# Patient Record
Sex: MALE | Race: WHITE | NOT HISPANIC OR LATINO | Employment: STUDENT | URBAN - METROPOLITAN AREA
[De-identification: names, ages, dates, MRNs, and addresses within clinical notes are randomized per-mention and may not be internally consistent; named-entity substitution may affect disease eponyms.]

---

## 2023-10-12 ENCOUNTER — TELEPHONE (OUTPATIENT)
Dept: PEDIATRIC ENDOCRINOLOGY | Facility: HOSPITAL | Age: 23
End: 2023-10-12
Payer: COMMERCIAL

## 2023-10-12 NOTE — TELEPHONE ENCOUNTER
Patient called office requesting an increase of supplies to 4 boxes from DME [employer]lier [employer].    This RN contacted [facility], see the following email response:    Good morning,  I wanted to update you on this patient Noe Rowe 2000. The patient has a current order with pump supplies with a quantity of  4 boxes for  [id number] and a updated DWO has already been faxed to the HCP.    Have a great day! ??

## 2023-10-22 PROBLEM — E10.9 TYPE 1 DIABETES MELLITUS WITH HEMOGLOBIN A1C GOAL OF LESS THAN 7.0% (MULTI): Status: ACTIVE | Noted: 2023-10-22

## 2023-10-22 PROBLEM — S60.211A CONTUSION OF RIGHT WRIST: Status: ACTIVE | Noted: 2023-10-22

## 2023-10-22 PROBLEM — E10.649: Status: ACTIVE | Noted: 2023-10-22

## 2023-10-22 PROBLEM — R11.0 NAUSEA IN ADULT: Status: ACTIVE | Noted: 2023-10-22

## 2023-10-22 PROBLEM — S93.409A ANKLE SPRAIN: Status: ACTIVE | Noted: 2023-10-22

## 2023-10-22 PROBLEM — K11.7 INCREASED SALIVATION: Status: ACTIVE | Noted: 2023-10-22

## 2023-10-22 PROBLEM — L70.0 ACNE VULGARIS: Status: ACTIVE | Noted: 2020-01-22

## 2023-10-22 PROBLEM — S99.919A ANKLE INJURY: Status: ACTIVE | Noted: 2023-10-22

## 2023-10-22 RX ORDER — GLUCAGON 3 MG/1
POWDER NASAL
COMMUNITY
Start: 2019-11-07

## 2023-10-22 RX ORDER — INSULIN LISPRO 100 [IU]/ML
120 INJECTION, SOLUTION INTRAVENOUS; SUBCUTANEOUS DAILY
COMMUNITY
End: 2024-01-12 | Stop reason: SDUPTHER

## 2023-10-22 RX ORDER — TRETINOIN 0.25 MG/G
CREAM TOPICAL
COMMUNITY
Start: 2019-10-30 | End: 2024-06-06 | Stop reason: WASHOUT

## 2023-10-22 RX ORDER — INSULIN GLARGINE 100 [IU]/ML
41 INJECTION, SOLUTION SUBCUTANEOUS DAILY
COMMUNITY
Start: 2020-01-06

## 2023-10-22 RX ORDER — ONDANSETRON 4 MG/1
TABLET, ORALLY DISINTEGRATING ORAL
COMMUNITY
Start: 2023-02-25 | End: 2024-06-06 | Stop reason: WASHOUT

## 2023-10-22 RX ORDER — MULTIVITAMIN
TABLET ORAL
COMMUNITY

## 2023-10-22 RX ORDER — BLOOD-GLUCOSE TRANSMITTER
EACH MISCELLANEOUS AS NEEDED
COMMUNITY

## 2023-10-22 RX ORDER — GLUCAGON HCL 1 MG
VIAL (EA) INJECTION
COMMUNITY
End: 2024-06-06 | Stop reason: WASHOUT

## 2023-10-24 ENCOUNTER — APPOINTMENT (OUTPATIENT)
Dept: PEDIATRIC ENDOCRINOLOGY | Facility: CLINIC | Age: 23
End: 2023-10-24
Payer: COMMERCIAL

## 2023-11-28 ENCOUNTER — APPOINTMENT (OUTPATIENT)
Dept: PEDIATRIC ENDOCRINOLOGY | Facility: CLINIC | Age: 23
End: 2023-11-28
Payer: COMMERCIAL

## 2024-01-12 DIAGNOSIS — E10.9 TYPE 1 DIABETES MELLITUS WITH HEMOGLOBIN A1C GOAL OF LESS THAN 7.0% (MULTI): ICD-10-CM

## 2024-01-12 RX ORDER — INSULIN LISPRO 100 [IU]/ML
INJECTION, SOLUTION INTRAVENOUS; SUBCUTANEOUS
Qty: 40 ML | Refills: 0 | Status: SHIPPED | OUTPATIENT
Start: 2024-01-12 | End: 2024-03-10 | Stop reason: SDUPTHER

## 2024-01-31 ENCOUNTER — APPOINTMENT (OUTPATIENT)
Dept: ENDOCRINOLOGY | Facility: CLINIC | Age: 24
End: 2024-01-31
Payer: COMMERCIAL

## 2024-02-19 DIAGNOSIS — E10.9 TYPE 1 DIABETES MELLITUS WITH HEMOGLOBIN A1C GOAL OF LESS THAN 7.0% (MULTI): ICD-10-CM

## 2024-03-10 DIAGNOSIS — E10.9 TYPE 1 DIABETES MELLITUS WITH HEMOGLOBIN A1C GOAL OF LESS THAN 7.0% (MULTI): ICD-10-CM

## 2024-03-10 RX ORDER — INSULIN LISPRO 100 [IU]/ML
INJECTION, SOLUTION INTRAVENOUS; SUBCUTANEOUS
Qty: 40 ML | Refills: 3 | Status: SHIPPED | OUTPATIENT
Start: 2024-03-10

## 2024-04-29 ENCOUNTER — TELEPHONE (OUTPATIENT)
Dept: PEDIATRIC ENDOCRINOLOGY | Facility: HOSPITAL | Age: 24
End: 2024-04-29
Payer: COMMERCIAL

## 2024-04-29 NOTE — TELEPHONE ENCOUNTER
Noe faxed his BMV to the endocrine office. Last office visit 1/2023. [facility] in 12/2023 with peds endo and [facility] for adult endo 1/2024. Attempted to call Noe to explain the office could not sign his BMV form since he has not been seen in >1 year and the office does not have an up to date glucose download. Unable to LVM due to a full mailbox

## 2024-05-22 ENCOUNTER — APPOINTMENT (OUTPATIENT)
Dept: ENDOCRINOLOGY | Facility: CLINIC | Age: 24
End: 2024-05-22
Payer: COMMERCIAL

## 2024-06-04 ENCOUNTER — TELEPHONE (OUTPATIENT)
Dept: PEDIATRIC ENDOCRINOLOGY | Facility: HOSPITAL | Age: 24
End: 2024-06-04
Payer: COMMERCIAL

## 2024-06-04 NOTE — TELEPHONE ENCOUNTER
Noe called and LVM requesting a call back to discuss his BMV form.    Called Noe back - he sent his BMV form over in April and he is wondering if it could be signed. Discussed with Noe that his last visit with us was 1/2023 and we would need to see him and download his pump prior to signing his BMV form. Noe stated understanding - forwarded him to the secretaries to schedule an appointment.

## 2024-06-06 ENCOUNTER — OFFICE VISIT (OUTPATIENT)
Dept: PEDIATRIC ENDOCRINOLOGY | Facility: CLINIC | Age: 24
End: 2024-06-06
Payer: COMMERCIAL

## 2024-06-06 VITALS
SYSTOLIC BLOOD PRESSURE: 122 MMHG | RESPIRATION RATE: 20 BRPM | WEIGHT: 212.96 LBS | HEART RATE: 68 BPM | DIASTOLIC BLOOD PRESSURE: 82 MMHG | HEIGHT: 70 IN | BODY MASS INDEX: 30.49 KG/M2

## 2024-06-06 DIAGNOSIS — E10.9 TYPE 1 DIABETES MELLITUS WITH HEMOGLOBIN A1C GOAL OF LESS THAN 7.0% (MULTI): Primary | ICD-10-CM

## 2024-06-06 LAB — POC HEMOGLOBIN A1C: 6.4 % (ref 4.2–6.5)

## 2024-06-06 RX ORDER — BLOOD-GLUCOSE SENSOR
EACH MISCELLANEOUS
COMMUNITY

## 2024-06-06 SDOH — ECONOMIC STABILITY: FOOD INSECURITY: WITHIN THE PAST 12 MONTHS, YOU WORRIED THAT YOUR FOOD WOULD RUN OUT BEFORE YOU GOT MONEY TO BUY MORE.: NEVER TRUE

## 2024-06-06 SDOH — ECONOMIC STABILITY: FOOD INSECURITY: WITHIN THE PAST 12 MONTHS, THE FOOD YOU BOUGHT JUST DIDN'T LAST AND YOU DIDN'T HAVE MONEY TO GET MORE.: NEVER TRUE

## 2024-06-06 ASSESSMENT — PATIENT HEALTH QUESTIONNAIRE - PHQ9
SUM OF ALL RESPONSES TO PHQ9 QUESTIONS 1 & 2: 0
1. LITTLE INTEREST OR PLEASURE IN DOING THINGS: NOT AT ALL
2. FEELING DOWN, DEPRESSED OR HOPELESS: NOT AT ALL

## 2024-06-06 ASSESSMENT — LIFESTYLE VARIABLES
HOW MANY STANDARD DRINKS CONTAINING ALCOHOL DO YOU HAVE ON A TYPICAL DAY: 1 OR 2
AUDIT-C TOTAL SCORE: 1
HOW OFTEN DO YOU HAVE A DRINK CONTAINING ALCOHOL: MONTHLY OR LESS
SKIP TO QUESTIONS 9-10: 1
HOW OFTEN DO YOU HAVE SIX OR MORE DRINKS ON ONE OCCASION: NEVER

## 2024-06-06 NOTE — PATIENT INSTRUCTIONS
It was good to see you today!  Your A1c was 6.4%.    Plan:  Integrate your sensor with your pump and turn Control IQ on.  If there is a lag time, call Tandem customer service to see if they can troubleshoot why there is a lag.  Make sure your date and time are correct in your pump.  We are giving you less for basal rate and less for carb coverage.  Due for eye exam.  Have results faxed to our office.  Due for annual labs.  They do need to be fasting; you can to go any  lab first thing in the morning before you eat.  We are signing your 's form today.  Log into Tandem Portal and request a software update.  Once you have updated your pump, contact [employer] and request to switch your sensors to Dexcom G7.  Follow up in 3 months with Dr. Adriana Alvarado.

## 2024-06-06 NOTE — PROGRESS NOTES
Subjective   Noe Rowe is a 24 y.o. male with type 1 diabetes.   Here by himself.  Last visit was January 2023    HPI  Diagnosed with diabetes 7/14/14 at [facility]  Transferred to [facility] pediatric endocrinology in January 2018.    Other Medical History:   None    Manages diabetes with Tandem T slim x2, not using control IQ    Current Pump Settings   HumaLOG U-100 Insulin 100 unit/mL solution   Last edited by Teressa Quinones RN on 6/6/2024 at 4:06 PM      Duration of insulin action:  2 hours      Basal Rate   Total Basal Dose: 40.8 units/day   Time units/hr   12:00 AM 1.7      Blood Glucose Target   Time mg/dL   12:00  - 120      Sensitivity Factor   Time mg/dL/unit   12:00 AM 25      Carb Ratio   Time g/unit   12:00 AM 6     -TDD: 98.78  -Total daily basal: 39.29  -Basal %: 40  -BG average: 154  -CGM wear time (%): 99.6  -Daily carb average: 268g     Concerns at this visit:   - needs BMV form signed  - going low overnight frequently (lows 10%)  - Not in Control IQ because he states that there was a lag time of ~15 minutes between what the G6 smith read and what his pump Dexcom values read.  However, pump date/time were off (today's date on pump was 10/30/23, off by 1.5 hours), which could have contributed to the lag in Dexcom values.    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  - Site rotation: abdomen, uses Autosoft 90, changes every 2-3 days     Carbohydrate counting:   - Patient states they are good at counting carbs.  - Patient states they are good at adherence to bolusing for  carbs.     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, golfs, plays basketball, treats lows as they come     Education Reviewed: driving with diabetes, Control IQ, Exercise, alcohol and diabetes, Dexcom G7, exercise mode in control IQ, site rotation, urine ketone checking, transition to adult endocrinology     Goals         Turn on Control IQ (pt-stated)       Integrate CGM and pump              Date of Diabetes Diagnosis: 07/14/14  Antibody Status at Diagnosis: BRYANT 65 AB positive (>250) 7/15/14  CGM Type: Dexcom G6  Using AID System: No  Boluses Per Day: 6  Time in range 70-180mg/dL (%): 59  Time low <70mg/dL (%): 10  Hypoglycemia Unawareness : No  ED/Hospitalizations related to Diabetes: No  ED/Hospitalization not related to Diabetes: Yes  ED/Hospitalization (Not Diabetes Related) Date: 02/20/23 (for broken foot)  ED/Hospitalization related to DKA: No  Severe Hypoglycemia (coma, seizure, disorientation, or the need for high dose glucagon) since last visit: No         Review of Systems   Constitutional:  Negative for activity change, appetite change, diaphoresis, fatigue and unexpected weight change.   HENT:  Negative for congestion, sore throat and voice change.    Respiratory:  Negative for cough, shortness of breath and wheezing.    Cardiovascular:  Negative for chest pain and palpitations.   Gastrointestinal:  Negative for abdominal pain, constipation, diarrhea, nausea and vomiting.   Endocrine: Negative for cold intolerance, heat intolerance, polydipsia, polyphagia and polyuria.   Genitourinary:  Negative for enuresis.   Musculoskeletal:  Negative for arthralgias and myalgias.   Skin:  Negative for rash.   Neurological:  Negative for seizures, weakness and headaches.   Psychiatric/Behavioral:  Negative for  "dysphoric mood and sleep disturbance. The patient is not nervous/anxious.    All other systems reviewed and are negative.      Objective   /82 (BP Location: Right arm, Patient Position: Sitting, BP Cuff Size: Large adult)   Pulse 68   Resp 20   Ht 1.774 m (5' 9.84\")   Wt 96.6 kg (212 lb 15.4 oz)   BMI 30.69 kg/m²      Physical Exam  Vitals reviewed. Exam conducted with a chaperone present.   Constitutional:       General: He is not in acute distress.     Appearance: Normal appearance.   HENT:      Head: Normocephalic.      Mouth/Throat:      Mouth: Mucous membranes are moist.   Eyes:      Conjunctiva/sclera: Conjunctivae normal.   Neck:      Comments: Normal thyroid, no nodules  Pulmonary:      Effort: Pulmonary effort is normal.   Skin:     General: Skin is warm.      Comments: No lipoatrophy or lipohypertrophy   Neurological:      General: No focal deficit present.      Mental Status: He is alert and oriented to person, place, and time.   Psychiatric:         Mood and Affect: Mood normal.         Behavior: Behavior normal.          Lab  Lab Results   Component Value Date    HGBA1C 6.4 06/06/2024    HGBA1C 7.9 05/04/2021       Assessment/Plan   Noe Rowe is a 24 y.o. male with type 1 diabetes. HbA1c at target. Good BP.   Would benefit from being on Tandem Control IQ.  Due for eye exam and annual diabetes labs.  Glucose Monitoring: having hypos, will back off on basal and carb coverage.       Insulin Instructions  Pump Settings   HumaLOG U-100 Insulin 100 unit/mL solution   Last edited by Teressa Quinones RN on 6/6/2024 at 5:00 PM      Duration of insulin action:  2 hours      Basal Rate   Total Basal Dose: 36 units/day   Time units/hr   12:00 AM 1.5      Blood Glucose Target   Time mg/dL   12:00  - 120      Sensitivity Factor   Time mg/dL/unit   12:00 AM 25      Carb Ratio   Time g/unit   12:00 AM 7       CGM Interpretation/Plan   14 day CGM download was reviewed in detail as documented above " under GLUCOSE MONITORING and will be attached to chart.  A minimum of 72 hours of glucose data was used to inform the management plan outlined above.    Teressa Quinones RN

## 2024-06-07 ASSESSMENT — ENCOUNTER SYMPTOMS
CONSTIPATION: 0
DIAPHORESIS: 0
ACTIVITY CHANGE: 0
WEAKNESS: 0
ABDOMINAL PAIN: 0
PALPITATIONS: 0
COUGH: 0
SHORTNESS OF BREATH: 0
NAUSEA: 0
UNEXPECTED WEIGHT CHANGE: 0
WHEEZING: 0
HEADACHES: 0
DYSPHORIC MOOD: 0
POLYPHAGIA: 0
SEIZURES: 0
NERVOUS/ANXIOUS: 0
ARTHRALGIAS: 0
VOMITING: 0
FATIGUE: 0
POLYDIPSIA: 0
APPETITE CHANGE: 0
SLEEP DISTURBANCE: 0
DIARRHEA: 0
SORE THROAT: 0
MYALGIAS: 0
VOICE CHANGE: 0

## 2024-07-06 DIAGNOSIS — E10.9 TYPE 1 DIABETES MELLITUS WITH HEMOGLOBIN A1C GOAL OF LESS THAN 7.0% (MULTI): ICD-10-CM

## 2024-07-09 RX ORDER — INSULIN LISPRO 100 [IU]/ML
INJECTION, SOLUTION INTRAVENOUS; SUBCUTANEOUS
Qty: 40 ML | Refills: 11 | Status: SHIPPED | OUTPATIENT
Start: 2024-07-09

## 2024-09-12 ENCOUNTER — APPOINTMENT (OUTPATIENT)
Dept: PEDIATRIC ENDOCRINOLOGY | Facility: CLINIC | Age: 24
End: 2024-09-12
Payer: COMMERCIAL

## 2024-09-12 ENCOUNTER — LAB (OUTPATIENT)
Dept: LAB | Facility: LAB | Age: 24
End: 2024-09-12
Payer: COMMERCIAL

## 2024-09-12 VITALS
WEIGHT: 209.8 LBS | RESPIRATION RATE: 18 BRPM | HEART RATE: 76 BPM | BODY MASS INDEX: 30.03 KG/M2 | DIASTOLIC BLOOD PRESSURE: 81 MMHG | SYSTOLIC BLOOD PRESSURE: 127 MMHG | HEIGHT: 70 IN

## 2024-09-12 DIAGNOSIS — E10.65 TYPE 1 DIABETES MELLITUS WITH HYPERGLYCEMIA (MULTI): ICD-10-CM

## 2024-09-12 DIAGNOSIS — E10.9 TYPE 1 DIABETES MELLITUS WITH HEMOGLOBIN A1C GOAL OF LESS THAN 7.0% (MULTI): ICD-10-CM

## 2024-09-12 DIAGNOSIS — E10.649 HYPOGLYCEMIA DUE TO TYPE 1 DIABETES MELLITUS (MULTI): ICD-10-CM

## 2024-09-12 DIAGNOSIS — Z96.41 INSULIN PUMP IN PLACE: ICD-10-CM

## 2024-09-12 DIAGNOSIS — E10.65 TYPE 1 DIABETES MELLITUS WITH HYPERGLYCEMIA (MULTI): Primary | ICD-10-CM

## 2024-09-12 PROBLEM — S99.919A ANKLE INJURY: Status: RESOLVED | Noted: 2023-10-22 | Resolved: 2024-09-12

## 2024-09-12 PROBLEM — R11.0 NAUSEA IN ADULT: Status: RESOLVED | Noted: 2023-10-22 | Resolved: 2024-09-12

## 2024-09-12 PROBLEM — S60.211A CONTUSION OF RIGHT WRIST: Status: RESOLVED | Noted: 2023-10-22 | Resolved: 2024-09-12

## 2024-09-12 PROBLEM — S93.409A ANKLE SPRAIN: Status: RESOLVED | Noted: 2023-10-22 | Resolved: 2024-09-12

## 2024-09-12 LAB
ALBUMIN SERPL BCP-MCNC: 4.6 G/DL (ref 3.4–5)
ALP SERPL-CCNC: 66 U/L (ref 33–120)
ALT SERPL W P-5'-P-CCNC: 15 U/L (ref 10–52)
ANION GAP SERPL CALC-SCNC: 13 MMOL/L (ref 10–20)
AST SERPL W P-5'-P-CCNC: 13 U/L (ref 9–39)
BILIRUB SERPL-MCNC: 1.1 MG/DL (ref 0–1.2)
BUN SERPL-MCNC: 15 MG/DL (ref 6–23)
CALCIUM SERPL-MCNC: 9.4 MG/DL (ref 8.6–10.6)
CHLORIDE SERPL-SCNC: 101 MMOL/L (ref 98–107)
CHOLEST SERPL-MCNC: 130 MG/DL (ref 0–199)
CHOLESTEROL/HDL RATIO: 3.1
CO2 SERPL-SCNC: 30 MMOL/L (ref 21–32)
CREAT SERPL-MCNC: 0.87 MG/DL (ref 0.5–1.3)
EGFRCR SERPLBLD CKD-EPI 2021: >90 ML/MIN/1.73M*2
EST. AVERAGE GLUCOSE BLD GHB EST-MCNC: 146 MG/DL
GLUCOSE SERPL-MCNC: 198 MG/DL (ref 74–99)
HBA1C MFR BLD: 6.7 %
HDLC SERPL-MCNC: 41.4 MG/DL
NON-HDL CHOLESTEROL: 89 MG/DL (ref 0–149)
POC HEMOGLOBIN A1C: 7.7 % (ref 4.2–6.5)
POTASSIUM SERPL-SCNC: 4.1 MMOL/L (ref 3.5–5.3)
PROT SERPL-MCNC: 6.8 G/DL (ref 6.4–8.2)
SODIUM SERPL-SCNC: 140 MMOL/L (ref 136–145)
TSH SERPL-ACNC: 1.13 MIU/L (ref 0.44–3.98)

## 2024-09-12 PROCEDURE — 3074F SYST BP LT 130 MM HG: CPT | Performed by: INTERNAL MEDICINE

## 2024-09-12 PROCEDURE — 82043 UR ALBUMIN QUANTITATIVE: CPT

## 2024-09-12 PROCEDURE — 83516 IMMUNOASSAY NONANTIBODY: CPT

## 2024-09-12 PROCEDURE — 84443 ASSAY THYROID STIM HORMONE: CPT

## 2024-09-12 PROCEDURE — 82465 ASSAY BLD/SERUM CHOLESTEROL: CPT

## 2024-09-12 PROCEDURE — 36415 COLL VENOUS BLD VENIPUNCTURE: CPT

## 2024-09-12 PROCEDURE — 95251 CONT GLUC MNTR ANALYSIS I&R: CPT | Performed by: INTERNAL MEDICINE

## 2024-09-12 PROCEDURE — 83036 HEMOGLOBIN GLYCOSYLATED A1C: CPT

## 2024-09-12 PROCEDURE — 80053 COMPREHEN METABOLIC PANEL: CPT

## 2024-09-12 PROCEDURE — 3008F BODY MASS INDEX DOCD: CPT | Performed by: INTERNAL MEDICINE

## 2024-09-12 PROCEDURE — 82570 ASSAY OF URINE CREATININE: CPT

## 2024-09-12 PROCEDURE — 83036 HEMOGLOBIN GLYCOSYLATED A1C: CPT | Performed by: INTERNAL MEDICINE

## 2024-09-12 PROCEDURE — 3079F DIAST BP 80-89 MM HG: CPT | Performed by: INTERNAL MEDICINE

## 2024-09-12 PROCEDURE — 83718 ASSAY OF LIPOPROTEIN: CPT

## 2024-09-12 PROCEDURE — 99215 OFFICE O/P EST HI 40 MIN: CPT | Performed by: INTERNAL MEDICINE

## 2024-09-12 NOTE — PATIENT INSTRUCTIONS
Please upgrade your tandem pump software so you can use Control IQ!!      [email or web address]/[email or web address]-iq-technology    Call the T[employer] rep if you run into any problems.    Different infusion set options: [email or web address]/products/infusion-sets/[email or web address]    Please get your labs drawn today.    Today we discussed transitioning to my adult clinic eventually.  To schedule an adult endocrinology appointment with Adriana Alvarado MD, please call scheduling ([phone]) and ask for an appointment in 6 months.  In the meantime, we'll see you back here in peds endocrine clinic in 3 months with me

## 2024-09-12 NOTE — PROGRESS NOTES
"Subjective   Noe Rowe is a 24 y.o. male with type 1 diabetes, here for follow-up.    Last visit: 6/6/24  Complications: none  Comorbidities: none    CURRENT DIABETES REGIMEN:  Tandem pump, NOT using control IQ right now because pump not yet upgraded        Timing of boluses: before  Sites: only rotating in stomach - issues of frequent leaking (autosoft xc 90); cgm in arms/legs    GLUCOSE MONITORING:  Using CGM Type: Dexcom G7 to monitor glucose    Patterns: prandial spikes, sporadic lows    Hypoglycemia awareness: yes    DIABETES Hx:  Diagnosed with diabetes 7/14/14 at [facility]  Transferred to [facility] pediatric endocrinology in January 2018.    Social:   - last semester of college, studying finance, will graduate from 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 [employer]  - lives with parents, but planning on moving out in Feb 2025 - apt with GF who works [place]    ROS otherwise negative    Objective   /81 (BP Location: Right arm, Patient Position: Sitting)   Pulse 76   Resp 18   Ht 1.769 m (5' 9.65\")   Wt 95.2 kg (209 lb 12.8 oz)   BMI 30.41 kg/m²      SCREENS/LABS:  Eye exam: couple years ago, but scheduled for 10/11/24  Lab Results   Component Value Date    MICROALBCREA 4.4 05/04/2021    MICROALBCREA SEE COMMENT 02/18/2020    LDLF 56 02/26/2019    TRIG 106 02/26/2019    CREATININE 0.85 05/04/2021    CREATININE 0.94 11/02/2018    TSH 1.69 05/04/2021    TSH 2.56 02/18/2020    TSH 1.77 02/26/2019    TTGA <1 02/26/2019    HGBA1C 7.7 (A) 09/12/2024    HGBA1C 6.4 06/06/2024    HGBA1C 7.9 05/04/2021         Physical Exam:  alert and conversant, in no acute distress  sclera anicteric, no lid lag, no proptosis  mmm  No goiter  normal work of breathing  normal mood/affect  no acanthosis; no lipohypertrophy    Assessment/Plan   24 y.o. M with Type 1 diabetes mellitus with hyperglycemia  Insulin pump in place  Hypoglycemia due to type 1 diabetes mellitus  Having fewer lows than before, however has " had uptrend in A1c to >7% and TIR is also below goal of >70%  Settings overall appropriate but would greatly benefit from using AID function of his pump (Control IQ) - just needs to get software upgraded and he wasn't sure how.   Still due for eye exam and annual diabetes labs.    PLAN  Upgrade pump software, reviewed how to do this  Leaking with autosoft sets, info on other options provided, [facility]  Labs today  Eye exam scheduled next month  No other changes to pump settings today  FUV with me in 3 mo here and 6 mo in adult clinic       CGM Interpretation:  14 day CGM download was reviewed in detail as documented above under GLUCOSE MONITORING. A minimum of 72 hours of glucose data was used to inform the management plan outlined above.    Time recorded as spent on this encounter today does NOT include time reviewing CGM data.

## 2024-09-13 LAB
CREAT UR-MCNC: 131.7 MG/DL (ref 20–370)
MICROALBUMIN UR-MCNC: <7 MG/L
MICROALBUMIN/CREAT UR: NORMAL MG/G{CREAT}
TTG IGA SER IA-ACNC: <1 U/ML

## 2024-10-09 ENCOUNTER — APPOINTMENT (OUTPATIENT)
Dept: ENDOCRINOLOGY | Facility: CLINIC | Age: 24
End: 2024-10-09
Payer: COMMERCIAL

## 2024-11-05 ENCOUNTER — OFFICE VISIT (OUTPATIENT)
Dept: URGENT CARE | Age: 24
End: 2024-11-05
Payer: COMMERCIAL

## 2024-11-05 VITALS
SYSTOLIC BLOOD PRESSURE: 135 MMHG | WEIGHT: 213 LBS | DIASTOLIC BLOOD PRESSURE: 73 MMHG | RESPIRATION RATE: 18 BRPM | BODY MASS INDEX: 29.82 KG/M2 | OXYGEN SATURATION: 99 % | HEIGHT: 71 IN | TEMPERATURE: 98.3 F | HEART RATE: 77 BPM

## 2024-11-05 DIAGNOSIS — R07.9 CHEST PAIN, UNSPECIFIED TYPE: ICD-10-CM

## 2024-11-05 DIAGNOSIS — R05.1 ACUTE COUGH: Primary | ICD-10-CM

## 2024-11-05 PROCEDURE — 1036F TOBACCO NON-USER: CPT | Performed by: STUDENT IN AN ORGANIZED HEALTH CARE EDUCATION/TRAINING PROGRAM

## 2024-11-05 PROCEDURE — 3062F POS MACROALBUMINURIA REV: CPT | Performed by: STUDENT IN AN ORGANIZED HEALTH CARE EDUCATION/TRAINING PROGRAM

## 2024-11-05 PROCEDURE — 99214 OFFICE O/P EST MOD 30 MIN: CPT | Performed by: STUDENT IN AN ORGANIZED HEALTH CARE EDUCATION/TRAINING PROGRAM

## 2024-11-05 PROCEDURE — 93000 ELECTROCARDIOGRAM COMPLETE: CPT | Performed by: STUDENT IN AN ORGANIZED HEALTH CARE EDUCATION/TRAINING PROGRAM

## 2024-11-05 PROCEDURE — 3075F SYST BP GE 130 - 139MM HG: CPT | Performed by: STUDENT IN AN ORGANIZED HEALTH CARE EDUCATION/TRAINING PROGRAM

## 2024-11-05 PROCEDURE — 3044F HG A1C LEVEL LT 7.0%: CPT | Performed by: STUDENT IN AN ORGANIZED HEALTH CARE EDUCATION/TRAINING PROGRAM

## 2024-11-05 PROCEDURE — 3008F BODY MASS INDEX DOCD: CPT | Performed by: STUDENT IN AN ORGANIZED HEALTH CARE EDUCATION/TRAINING PROGRAM

## 2024-11-05 PROCEDURE — 3078F DIAST BP <80 MM HG: CPT | Performed by: STUDENT IN AN ORGANIZED HEALTH CARE EDUCATION/TRAINING PROGRAM

## 2024-11-05 RX ORDER — DOXYCYCLINE 100 MG/1
100 CAPSULE ORAL 2 TIMES DAILY
Qty: 14 CAPSULE | Refills: 0 | Status: SHIPPED | OUTPATIENT
Start: 2024-11-05 | End: 2024-11-12

## 2024-11-05 RX ORDER — BENZONATATE 200 MG/1
200 CAPSULE ORAL 3 TIMES DAILY PRN
Qty: 42 CAPSULE | Refills: 0 | Status: SHIPPED | OUTPATIENT
Start: 2024-11-05 | End: 2024-11-19

## 2024-11-05 ASSESSMENT — ENCOUNTER SYMPTOMS
CHEST TIGHTNESS: 1
COUGH: 1

## 2024-11-05 NOTE — PATIENT INSTRUCTIONS
Please follow up with your primary provider within one week if symptoms do not improve.  You may schedule an appointment online at [email or web address] or call [phone]. Go to the Emergency Department if symptoms significantly worsen or if you develop symptoms including but not limited to chest pain or shortness of breath.

## 2024-11-05 NOTE — PROGRESS NOTES
"Subjective   Patient ID: Noe Rowe is a 24 y.o. male. They present today with a chief complaint of Cough and URI (Chest pain x 3 weeks).    History of Present Illness  Pt presents with illness x 3 weeks. Sx include productive cough with light yellow/green sputum. Slight runny nose in AM. Starting yesterday, he noticed R sided chest wall pain/tightness after coughing episodes only. Silas known sick contacts. Was taking otc cold and flu medication/decongestant at beginning of sx onset but was not helping so he stopped. Pmhx of TI, states B/S have been normal. He is a not a smoker. Denies fever chills sob wheezing back pain dizziness leg swelling hemoptysis sore throat congestion ear pain.       History provided by:  Patient  Cough    URI  Presenting symptoms: cough        Past Medical History  Allergies as of 11/05/2024    (No Known Allergies)       (Not in a hospital admission)       History reviewed. No pertinent past medical history.    History reviewed. No pertinent surgical history.     reports that he has never smoked. He has never been exposed to tobacco smoke. He has never used smokeless tobacco. He reports that he does not currently use alcohol. He reports that he does not use drugs.    Review of Systems  Review of Systems   Respiratory:  Positive for cough and chest tightness (R side chest after coughing only).    All other systems reviewed and are negative.                                 Objective    Vitals:    11/05/24 1817   BP: 135/73   BP Location: Left arm   Patient Position: Sitting   BP Cuff Size: Adult   Pulse: 77   Resp: 18   Temp: 36.8 °C (98.3 °F)   TempSrc: Oral   SpO2: 99%   Weight: 96.6 kg (213 lb)   Height: 1.803 m (5' 11\")     No LMP for male patient.    Physical Exam  Vitals and nursing note reviewed.   Constitutional:       General: He is not in acute distress.     Appearance: Normal appearance. He is not ill-appearing, toxic-appearing or diaphoretic.   HENT:      Head: " Normocephalic and atraumatic.      Right Ear: Tympanic membrane, ear canal and external ear normal.      Left Ear: Tympanic membrane, ear canal and external ear normal.      Nose: Nose normal.      Mouth/Throat:      Mouth: Mucous membranes are moist.   Cardiovascular:      Rate and Rhythm: Normal rate and regular rhythm.      Pulses: Normal pulses.      Heart sounds: No murmur heard.  Pulmonary:      Effort: Pulmonary effort is normal. No respiratory distress.      Breath sounds: No wheezing or rales.   Musculoskeletal:      Right lower leg: No edema.      Left lower leg: No edema.   Skin:     Capillary Refill: Capillary refill takes less than 2 seconds.      Findings: No rash.   Neurological:      General: No focal deficit present.      Mental Status: He is alert.         Procedures    Point of Care Test & Imaging Results from this visit  No results found for this visit on 11/05/24.   ECG 12 Lead    Result Date: 11/5/2024  EKG shows nsr hr 85. No acute ischemic EKG changes. Normal axis and intervals. No arrhthymias noted. No prior EKG to compare.      Diagnostic study results (if any) were reviewed by Susu Nino PA-C.    Assessment/Plan   Allergies, medications, history, and pertinent labs/EKGs/Imaging reviewed by Susu Nino PA-C.     Medical Decision Making  EKG shows NSR 85. No prior EKG to compare. No acute ischemic EKG changes noted    Lungs clear, no respiratory distress. 99% on RA. No  tachycardia or tachypnea.     No in house xray available today, pt was given order and he will complete at another outpatient facility. I will contact patient via phone once complete to discuss results    Ddx: pneumonia (atypical, typical), bronchitis    Supportive care    Advised follow up with primary care in 1 week    Strict ED precautions discussed    Orders and Diagnoses  Diagnoses and all orders for this visit:  Acute cough  -     ECG 12 Lead  -     XR chest 2 views; Future  -     doxycycline (Vibramycin)  100 mg capsule; Take 1 capsule (100 mg) by mouth 2 times a day for 7 days. Take with at least 8 ounces (large glass) of water, do not lie down for 30 minutes after  -     benzonatate (Tessalon) 200 mg capsule; Take 1 capsule (200 mg) by mouth 3 times a day as needed for cough for up to 14 days. Do not crush or chew.  Chest pain, unspecified type  -     ECG 12 Lead  -     XR chest 2 views; Future      Medical Admin Record      Patient disposition: Home    Electronically signed by Susu Nino PA-C  7:26 PM

## 2024-11-06 ENCOUNTER — ANCILLARY PROCEDURE (OUTPATIENT)
Dept: URGENT CARE | Age: 24
End: 2024-11-06
Payer: COMMERCIAL

## 2024-11-06 DIAGNOSIS — R05.1 ACUTE COUGH: ICD-10-CM

## 2024-11-06 DIAGNOSIS — R07.9 CHEST PAIN, UNSPECIFIED TYPE: ICD-10-CM

## 2024-11-06 PROCEDURE — 71046 X-RAY EXAM CHEST 2 VIEWS: CPT | Performed by: STUDENT IN AN ORGANIZED HEALTH CARE EDUCATION/TRAINING PROGRAM

## 2024-12-12 ENCOUNTER — APPOINTMENT (OUTPATIENT)
Dept: PEDIATRIC ENDOCRINOLOGY | Facility: CLINIC | Age: 24
End: 2024-12-12
Payer: COMMERCIAL

## 2024-12-21 ENCOUNTER — OFFICE VISIT (OUTPATIENT)
Dept: URGENT CARE | Age: 24
End: 2024-12-21
Payer: COMMERCIAL

## 2024-12-21 VITALS
BODY MASS INDEX: 30.1 KG/M2 | DIASTOLIC BLOOD PRESSURE: 74 MMHG | TEMPERATURE: 98.2 F | SYSTOLIC BLOOD PRESSURE: 131 MMHG | HEIGHT: 71 IN | WEIGHT: 215 LBS | RESPIRATION RATE: 16 BRPM | HEART RATE: 87 BPM | OXYGEN SATURATION: 99 %

## 2024-12-21 DIAGNOSIS — S39.012A STRAIN OF LUMBAR REGION, INITIAL ENCOUNTER: Primary | ICD-10-CM

## 2024-12-21 RX ORDER — NAPROXEN 500 MG/1
500 TABLET ORAL 2 TIMES DAILY PRN
Qty: 28 TABLET | Refills: 0 | Status: SHIPPED | OUTPATIENT
Start: 2024-12-21 | End: 2025-01-04

## 2024-12-21 ASSESSMENT — PAIN SCALES - GENERAL: PAINLEVEL_OUTOF10: 8

## 2024-12-21 ASSESSMENT — ENCOUNTER SYMPTOMS: BACK PAIN: 1

## 2024-12-21 NOTE — PROGRESS NOTES
"Subjective   Patient ID: Noe Rowe is a 24 y.o. male. They present today with a chief complaint of Back Pain (Lower back pain since last night).    History of Present Illness  Reports atraumatic left lower back aching that radiates to the left lower quadrant of his abdomen for the past day.  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  Denies numbness, tingling, nausea, vomiting, diarrhea, constipation, fever, urinary symptoms, rash.      Back Pain        Past Medical History  Allergies as of 12/21/2024 - Reviewed 12/21/2024   Allergen Reaction Noted    Pollen extracts Unknown 07/22/2014       (Not in a hospital admission)       No past medical history on file.    No past surgical history on file.     reports that he has never smoked. He has never been exposed to tobacco smoke. He has never used smokeless tobacco. He reports that he does not currently use alcohol. He reports that he does not use drugs.    Review of Systems  Pertinent systems reviewed and were negative unless otherwise stated in HPI.    Objective    Vitals:    12/21/24 0940   BP: 131/74   BP Location: Right arm   Patient Position: Sitting   BP Cuff Size: Adult   Pulse: 87   Resp: 16   Temp: 36.8 °C (98.2 °F)   TempSrc: Oral   SpO2: 99%   Weight: 97.5 kg (215 lb)   Height: 1.803 m (5' 11\")     No LMP for male patient.    Physical Exam  Constitutional:       General: He is not in acute distress.  Abdominal:      General: There is no distension.      Palpations: Abdomen is soft.      Tenderness: There is abdominal tenderness (LLQ). There is no guarding or rebound.   Musculoskeletal:      Right wrist: Normal pulse.      Left wrist: Normal pulse.      Right hand: Normal strength.      Left hand: Normal strength.      Cervical back: Normal.      Thoracic back: Normal.      Lumbar back: Tenderness (left) present. No swelling, edema, deformity, signs of trauma, spasms or bony tenderness. Decreased range of motion. "   Neurological:      General: No focal deficit present.      Motor: No weakness.      Gait: Gait normal.         Diagnostic study results (if any) were reviewed by Pierre Garcia PA-C.    Assessment/Plan   Allergies, medications, history, and pertinent labs/EKGs/imaging reviewed by Pierre Garcia PA-C.     Medical Decision Making  No red flags for back pain.  Low concern for nephrolithiasis, appendicitis, other acute abdominal process, testicular torsion, constipation, SBO, strangulated or incarcerated hernia.  Tenderness is reproducible, worse with flexion of the left hip and abdominal flexion.  Most likely musculoskeletal, caused by heavy lifting.  Offered naproxen in-house, declined, sent to pharmacy.  Offered cyclobenzaprine, declined.    Orders and Diagnoses  Diagnoses and all orders for this visit:  Strain of lumbar region, initial encounter  -     naproxen (Naprosyn) 500 mg tablet; Take 1 tablet (500 mg) by mouth 2 times a day as needed for moderate pain (4 - 6) (pain) for up to 14 days.      Medical Admin Record      Disposition: Home    Electronically signed by Pierre Garcia PA-C

## 2024-12-21 NOTE — PATIENT INSTRUCTIONS
Please follow up with your primary provider within one week if symptoms do not improve.  You may schedule an appointment online at [email or web address] or call [phone]. Go to the Emergency Department if symptoms significantly worsen or if you develop chest pain or shortness of breath.    I recommend Tylenol for pain if naproxen alone is ineffective

## 2025-02-13 ENCOUNTER — APPOINTMENT (OUTPATIENT)
Dept: PEDIATRIC ENDOCRINOLOGY | Facility: CLINIC | Age: 25
End: 2025-02-13
Payer: COMMERCIAL

## 2025-03-07 ENCOUNTER — TELEPHONE (OUTPATIENT)
Dept: PEDIATRIC ENDOCRINOLOGY | Facility: HOSPITAL | Age: 25
End: 2025-03-07
Payer: COMMERCIAL

## 2025-03-07 NOTE — TELEPHONE ENCOUNTER
Spoke with Noe he just dropped his last vial of insulin on the ground and it broke and shattered. Pharmacy wont have all 4 of his insulin vials ready until tomorrow at 3pm and he needs insulin now. Discussed that the pharmacy can give him a partial fill with what they have for him right now and fill the rest when they have it in stock tomorrow. Discussed if they do not have it he can transfer it to another drug mart or call and have us send a new insulin script to a different pharmacy. Noe appreciative of the call and believes the pharmacy has 2/4 of his vials he needs and he can go pick them up today. He has the on call phone number if he is having any issues.

## 2025-03-20 ENCOUNTER — APPOINTMENT (OUTPATIENT)
Dept: PEDIATRIC ENDOCRINOLOGY | Facility: CLINIC | Age: 25
End: 2025-03-20
Payer: COMMERCIAL

## 2025-04-10 ENCOUNTER — APPOINTMENT (OUTPATIENT)
Dept: PEDIATRIC ENDOCRINOLOGY | Facility: CLINIC | Age: 25
End: 2025-04-10
Payer: COMMERCIAL

## 2025-05-23 ENCOUNTER — APPOINTMENT (OUTPATIENT)
Dept: PEDIATRIC ENDOCRINOLOGY | Facility: CLINIC | Age: 25
End: 2025-05-23
Payer: COMMERCIAL

## 2025-05-27 ENCOUNTER — TELEPHONE (OUTPATIENT)
Dept: PEDIATRIC ENDOCRINOLOGY | Facility: HOSPITAL | Age: 25
End: 2025-05-27
Payer: COMMERCIAL

## 2025-05-27 NOTE — TELEPHONE ENCOUNTER
Spoke with Noe.  He would like BMV form signed.   Need pump download:  Open Tandem smith and link to smith (leave it open).  Resend the BMV form to our fax.  Emailed list of adult providers to him.

## 2025-06-22 ENCOUNTER — HOSPITAL ENCOUNTER (EMERGENCY)
Facility: HOSPITAL | Age: 25
Discharge: HOME | End: 2025-06-22
Attending: STUDENT IN AN ORGANIZED HEALTH CARE EDUCATION/TRAINING PROGRAM
Payer: COMMERCIAL

## 2025-06-22 VITALS
RESPIRATION RATE: 18 BRPM | HEART RATE: 52 BPM | TEMPERATURE: 97.7 F | WEIGHT: 210.4 LBS | HEIGHT: 72 IN | OXYGEN SATURATION: 100 % | SYSTOLIC BLOOD PRESSURE: 140 MMHG | BODY MASS INDEX: 28.5 KG/M2 | DIASTOLIC BLOOD PRESSURE: 82 MMHG

## 2025-06-22 DIAGNOSIS — K08.89 PAIN, DENTAL: Primary | ICD-10-CM

## 2025-06-22 PROCEDURE — 2500000001 HC RX 250 WO HCPCS SELF ADMINISTERED DRUGS (ALT 637 FOR MEDICARE OP): Performed by: STUDENT IN AN ORGANIZED HEALTH CARE EDUCATION/TRAINING PROGRAM

## 2025-06-22 PROCEDURE — 99283 EMERGENCY DEPT VISIT LOW MDM: CPT | Performed by: STUDENT IN AN ORGANIZED HEALTH CARE EDUCATION/TRAINING PROGRAM

## 2025-06-22 RX ORDER — KETOROLAC TROMETHAMINE 10 MG/1
10 TABLET, FILM COATED ORAL EVERY 6 HOURS PRN
Qty: 20 TABLET | Refills: 0 | Status: SHIPPED | OUTPATIENT
Start: 2025-06-22 | End: 2025-06-27

## 2025-06-22 RX ORDER — AMOXICILLIN AND CLAVULANATE POTASSIUM 875; 125 MG/1; MG/1
1 TABLET, FILM COATED ORAL ONCE
Status: COMPLETED | OUTPATIENT
Start: 2025-06-22 | End: 2025-06-22

## 2025-06-22 RX ORDER — AMOXICILLIN AND CLAVULANATE POTASSIUM 875; 125 MG/1; MG/1
1 TABLET, FILM COATED ORAL EVERY 12 HOURS
Qty: 14 TABLET | Refills: 0 | Status: SHIPPED | OUTPATIENT
Start: 2025-06-22 | End: 2025-06-22

## 2025-06-22 RX ORDER — KETOROLAC TROMETHAMINE 10 MG/1
10 TABLET, FILM COATED ORAL EVERY 6 HOURS PRN
Qty: 20 TABLET | Refills: 0 | Status: SHIPPED | OUTPATIENT
Start: 2025-06-22 | End: 2025-06-22

## 2025-06-22 RX ORDER — AMOXICILLIN AND CLAVULANATE POTASSIUM 875; 125 MG/1; MG/1
1 TABLET, FILM COATED ORAL EVERY 12 HOURS
Qty: 14 TABLET | Refills: 0 | Status: SHIPPED | OUTPATIENT
Start: 2025-06-22 | End: 2025-06-29

## 2025-06-22 RX ORDER — KETOROLAC TROMETHAMINE 10 MG/1
20 TABLET, FILM COATED ORAL ONCE
Status: COMPLETED | OUTPATIENT
Start: 2025-06-22 | End: 2025-06-22

## 2025-06-22 RX ORDER — OXYCODONE AND ACETAMINOPHEN 5; 325 MG/1; MG/1
1 TABLET ORAL ONCE
Refills: 0 | Status: COMPLETED | OUTPATIENT
Start: 2025-06-22 | End: 2025-06-22

## 2025-06-22 RX ADMIN — KETOROLAC TROMETHAMINE 20 MG: 10 TABLET, FILM COATED ORAL at 04:05

## 2025-06-22 RX ADMIN — AMOXICILLIN AND CLAVULANATE POTASSIUM 1 TABLET: 875; 125 TABLET, COATED ORAL at 04:05

## 2025-06-22 RX ADMIN — OXYCODONE HYDROCHLORIDE AND ACETAMINOPHEN 1 TABLET: 5; 325 TABLET ORAL at 04:06

## 2025-06-22 ASSESSMENT — PAIN DESCRIPTION - PROGRESSION: CLINICAL_PROGRESSION: GRADUALLY WORSENING

## 2025-06-22 ASSESSMENT — PAIN DESCRIPTION - ORIENTATION: ORIENTATION: LEFT;LOWER

## 2025-06-22 ASSESSMENT — PAIN SCALES - GENERAL: PAINLEVEL_OUTOF10: 7

## 2025-06-22 ASSESSMENT — PAIN - FUNCTIONAL ASSESSMENT: PAIN_FUNCTIONAL_ASSESSMENT: 0-10

## 2025-06-22 ASSESSMENT — PAIN DESCRIPTION - PAIN TYPE: TYPE: ACUTE PAIN

## 2025-06-22 ASSESSMENT — PAIN DESCRIPTION - DESCRIPTORS: DESCRIPTORS: THROBBING

## 2025-06-22 ASSESSMENT — PAIN DESCRIPTION - LOCATION: LOCATION: TEETH

## 2025-06-22 ASSESSMENT — PAIN DESCRIPTION - ONSET: ONSET: ONGOING

## 2025-06-22 ASSESSMENT — PAIN DESCRIPTION - FREQUENCY: FREQUENCY: CONSTANT/CONTINUOUS

## 2025-06-22 NOTE — ED NOTES
Pt stated that he did not have a ride and was supposed to take oxy.  made aware. Stated he was okay with pt taking oxy and driving home.      Carmen Monroy RN  06/22/25 [id number]

## 2025-06-22 NOTE — DISCHARGE INSTRUCTIONS
Take Augmentin as prescribed 2 times a day for next 7 days for treatment of possible dental infection.  Follow-up with a dentist as soon as possible for definitive management of this issue.  This is not able to be fixed in the emergency department without the expertise of a dentist.    Seek immediate medical attention if you develop: Severe worsening of pain that is unmanageable at home, worsening swelling and redness of your face, difficulty swallowing, difficulty breathing, drooling, inability to open your mouth due to severe pain, or any new or worsening symptoms you feel require emergent evaluation by physician    You should follow-up with your dentist for definitive treatment of your dental issue, you do not have a dentist to see you can see Dr. Garza by calling the office to make an appointment using the information provided

## 2025-06-22 NOTE — ED PROVIDER NOTES
HPI   Chief Complaint   Patient presents with   • Dental Pain     Pt reports left lower dental pain for the past 2-3 days. Last dose of ibuprofen at 0115, 600mg taken.         This is a 25-year-old male presenting the ED for evaluation and management of dental pain affecting the left lower molars.  He states that he knows he has broken molars here which have been an issue that is ongoing.  He states that he is currently seeing a dentist which fixed one of his teeth on the right side.  He states that he he is planning on following up with a dentist to fix these other broken teeth but he has not been scheduled to do so yet, he was trying to get into their office at the end of this past week but was not able to do so and will try to see them this week.  He states that he had some mild pain to the area for the past 3 to 4 days but tonight it became much worse which is the reason he came to the ED because he cannot sleep due to the dental pain.  He denies any trauma or injury to the area, any drainage to the area, any fevers or chills or systemic signs of infection.  He has tried ibuprofen at home without relief.      History provided by:  Patient   used: No            Patient History   Medical History[1]  Surgical History[2]  Family History[3]  Social History[4]    Physical Exam   ED Triage Vitals [06/22/25 0335]   Temperature Heart Rate Respirations BP   36.5 °C (97.7 °F) 52 18 140/82      Pulse Ox Temp Source Heart Rate Source Patient Position   100 % Tympanic Monitor Sitting      BP Location FiO2 (%)     Left arm --       Physical Exam  General: well developed, well nourished adult male who is awake and alert, in no apparent distress  Eyes: sclera clear bilaterally, PERRL, EOMI  HENT: normocephalic, atraumatic. Pharynx without erythema or exudates, uvula midline.  The left rear lower molars are broken, there is no surrounding erythema or swelling, no visible or palpable dental abscess.  No  trismus.  CV: regular rate and rhythm, no murmur, no gallops, or rubs.   Resp: clear to ascultation bilaterally, no wheezes, rales, or rhonchi  Psych: appropriate mood and affect, cooperative with exam  Skin: warm, dry, without evidence of rash or abrasions    ED Course & MDM   ED Course as of 06/24/25 0254   Sun Jun 22, 2025   1120 [place] [facility] [place].  I did not see this patient prescriptions were sent to the pharmacy of request [TB]      ED Course User Index  [TB] Samira ERICK Ziegler, APRN-CNP         Diagnoses as of 06/24/25 0254   Pain, dental                 No data recorded     [place] Coma Scale Score: 15 (06/22/25 0336 : Ginny Barnett, EMT)                           Medical Decision Making  The patient is awake and alert, no acute distress here.  He has 2 broken molars on the left lower jaw that are now causing him pain over the past few days, acutely worsening tonight.  There is no visible or palpable dental abscess that can be drained here in the ED.  I started him on Augmentin due to a high suspicion for dental infection causing worsening of his pain.  He was given a dose of Toradol and Percocet in the ED for pain relief.  I did offer a dental block which the patient was not interested in tonight.  He was prescribed Toradol and Augmentin for continued treatment of his symptoms as an outpatient until he is able to follow-up with his dentist.  Return precautions were discussed including trismus causing inability to tolerate oral intake, severe worsening of his pain, signs of spreading infection that would require reassessment by physician.  He was discharged in improved condition.    Procedure  Procedures       Gabriele Dover DO  06/22/25 0547         [1]  History reviewed. No pertinent past medical history.  [2]  History reviewed. No pertinent surgical history.  [3]  Family History  Problem Relation Name Age of Onset   • Other  (malignant neoplasm of oral cavity) Mother     [4]  Social History  Tobacco Use   • Smoking status: Never     Passive exposure: Never   • Smokeless tobacco: Never   Substance Use Topics   • Alcohol use: Not Currently     Comment: rare use   • Drug use: Never      Gabriele Dover DO  06/24/25 0254

## 2025-07-12 ENCOUNTER — OFFICE VISIT (OUTPATIENT)
Dept: URGENT CARE | Age: 25
End: 2025-07-12
Payer: COMMERCIAL

## 2025-07-12 DIAGNOSIS — K08.89 PAIN, DENTAL: Primary | ICD-10-CM

## 2025-07-12 RX ORDER — AMOXICILLIN AND CLAVULANATE POTASSIUM 875; 125 MG/1; MG/1
875 TABLET, FILM COATED ORAL 2 TIMES DAILY
Qty: 20 TABLET | Refills: 0 | Status: SHIPPED | OUTPATIENT
Start: 2025-07-12

## 2025-07-12 RX ORDER — KETOROLAC TROMETHAMINE 10 MG/1
10 TABLET, FILM COATED ORAL EVERY 6 HOURS PRN
Qty: 20 TABLET | Refills: 0 | Status: SHIPPED | OUTPATIENT
Start: 2025-07-12 | End: 2025-07-17

## 2025-07-12 NOTE — PROGRESS NOTES
Urgent Care Virtual Video Visit    Patient Location: Home  Provider Location: [facility]    Patient presents to urgent care via virtual visit for complaints of dental pain. Patient states he is scheduled for a root canal but states it had to be delayed due to new insurance. Reports yesterday he started to develop pain. Denies fever or chills. Denies facial swelling or difficulty swallowing.    Patient alert oriented x 3  Patient speaking in full sentences.  No audible wheezing or cough.    Will place patient on Augmentin and Toradol for his dental pain.  Discussed with patient if he develops a fever, facial swelling or difficulty swallowing then go straight to the emergency room.    I have communicated my name and active licensure. Video visit completed with realtime synchronous video/audio connection. Informed consent was obtained from the patient. Patient was made aware that my evaluation and diagnosis are limited due to the fact that we are not in the same room during the interview and that this is a virtual encounter that took place via videoconferencing. Patient verbalized understanding.     Patient disposition: Home    Electronically signed by [facility]  10:53 AM

## 2025-07-24 ENCOUNTER — APPOINTMENT (OUTPATIENT)
Dept: PEDIATRIC ENDOCRINOLOGY | Facility: CLINIC | Age: 25
End: 2025-07-24
Payer: COMMERCIAL

## 2025-07-24 ENCOUNTER — SOCIAL WORK (OUTPATIENT)
Dept: PEDIATRIC ENDOCRINOLOGY | Facility: CLINIC | Age: 25
End: 2025-07-24

## 2025-07-24 NOTE — PROGRESS NOTES
Patient was referred to  by Dr. Schafer due to a missed appointment and several missed appointments within the past 6-8 months. Due to patient's age, ALBERTO will reach out to talk to Noe about transitioning to the adult endocrine side. ALBERTO left Noe a message. Batsheva Godoy, ALEX, LISW-S #[phone].